# Patient Record
Sex: MALE | Race: BLACK OR AFRICAN AMERICAN
[De-identification: names, ages, dates, MRNs, and addresses within clinical notes are randomized per-mention and may not be internally consistent; named-entity substitution may affect disease eponyms.]

---

## 2019-12-03 ENCOUNTER — HOSPITAL ENCOUNTER (EMERGENCY)
Dept: HOSPITAL 74 - JERFT | Age: 54
Discharge: HOME | End: 2019-12-03
Payer: COMMERCIAL

## 2019-12-03 VITALS — HEART RATE: 90 BPM | DIASTOLIC BLOOD PRESSURE: 99 MMHG | SYSTOLIC BLOOD PRESSURE: 147 MMHG | TEMPERATURE: 97.9 F

## 2019-12-03 VITALS — BODY MASS INDEX: 29 KG/M2
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
## 2019-12-03 DIAGNOSIS — I10: ICD-10-CM
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
## 2019-12-03 DIAGNOSIS — S39.012A: ICD-10-CM

## 2019-12-03 DIAGNOSIS — S16.1XXA: Primary | ICD-10-CM
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
## 2019-12-03 DIAGNOSIS — E78.5: ICD-10-CM
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
## 2019-12-03 RX ADMIN — CYCLOBENZAPRINE HYDROCHLORIDE ONE MG: 10 TABLET, FILM COATED ORAL at 17:28

## 2019-12-03 RX ADMIN — KETOROLAC TROMETHAMINE ONE: 30 INJECTION, SOLUTION INTRAMUSCULAR at 17:34

## 2019-12-03 RX ADMIN — CYCLOBENZAPRINE HYDROCHLORIDE ONE: 10 TABLET, FILM COATED ORAL at 17:35

## 2019-12-03 RX ADMIN — KETOROLAC TROMETHAMINE ONE MG: 30 INJECTION, SOLUTION INTRAMUSCULAR at 17:28
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
